# Patient Record
Sex: FEMALE | Race: WHITE | NOT HISPANIC OR LATINO | Employment: STUDENT | URBAN - METROPOLITAN AREA
[De-identification: names, ages, dates, MRNs, and addresses within clinical notes are randomized per-mention and may not be internally consistent; named-entity substitution may affect disease eponyms.]

---
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
## 2022-01-04 ENCOUNTER — TELEPHONE (OUTPATIENT)
Dept: PSYCHIATRY | Facility: CLINIC | Age: 22
End: 2022-01-04

## 2022-01-04 NOTE — TELEPHONE ENCOUNTER
Placed call to pt, regarding medication she increased due to death of her father  Explained to her that increasing the medication without the dr knowing is not advised  Covering dr suggested for her to get counseling  I offered a few appts for tomorrow  Per pt if I can't see Dr Satish Orourke, I don't want anyone else  I will find my own couselor  She also stated that the Xanax she increased wasn't doing anything for her at the lower dose so she isn't going to take it  I explained to her that Dr Vivian Durham is on a medical leave and she could make an appt for when she gets back to discuss her taking Xanax  She only wants to discuss the medication with Dr Vivian Durham and no one else

## 2022-01-21 ENCOUNTER — HOSPITAL ENCOUNTER (OUTPATIENT)
Dept: MRI IMAGING | Facility: HOSPITAL | Age: 22
Discharge: HOME | End: 2022-01-21
Payer: COMMERCIAL

## 2022-01-21 DIAGNOSIS — M54.81 OCCIPITAL NEURALGIA: ICD-10-CM

## 2022-01-21 PROCEDURE — G1004 CDSM NDSC: HCPCS

## 2022-01-21 PROCEDURE — 70551 MRI BRAIN STEM W/O DYE: CPT

## 2022-01-28 ENCOUNTER — TELEPHONE (OUTPATIENT)
Dept: NEUROLOGY | Facility: CLINIC | Age: 22
End: 2022-01-28

## 2022-02-09 ENCOUNTER — TELEPHONE (OUTPATIENT)
Dept: PSYCHIATRY | Facility: CLINIC | Age: 22
End: 2022-02-09

## 2022-02-09 NOTE — TELEPHONE ENCOUNTER
----- Message from Nicole Ibrahim sent at 2022  4:17 PM EST -----  Regarding: RE: Crisis  I a, sorry for her loss, but she has now is bereavement  Her 2 tabs of Xanax will simply reduce anxiety  Nut not her sadness or emotional pain   ----- Message -----  From: Kristina Norman  Sent: 2022   3:45 PM EST  To: Nicole Ibrahim, Nadira Silvestre  Subject: Crisis                                           Patients father just  yesterday and her anxiety is through the roof  She doesn't know what to do and was just crying on the phone  She tried increasing the xanax to 2 tabs and doesn't seem to help with the anxiety   Wants to know if she can take more or if it can be changed but she is hysterical

## 2022-03-03 ENCOUNTER — HOSPITAL ENCOUNTER (OUTPATIENT)
Dept: RADIOLOGY | Facility: HOSPITAL | Age: 22
Discharge: HOME | End: 2022-03-03
Payer: COMMERCIAL

## 2022-03-03 DIAGNOSIS — M94.0 COSTOCHONDRAL JUNCTION SYNDROME: ICD-10-CM

## 2022-03-03 PROCEDURE — 71046 X-RAY EXAM CHEST 2 VIEWS: CPT

## 2022-03-29 ENCOUNTER — APPOINTMENT (EMERGENCY)
Dept: RADIOLOGY | Facility: HOSPITAL | Age: 22
End: 2022-03-29
Payer: COMMERCIAL

## 2022-03-29 ENCOUNTER — HOSPITAL ENCOUNTER (EMERGENCY)
Facility: HOSPITAL | Age: 22
Discharge: HOME | End: 2022-03-29
Attending: EMERGENCY MEDICINE
Payer: COMMERCIAL

## 2022-03-29 VITALS
HEART RATE: 72 BPM | BODY MASS INDEX: 25.71 KG/M2 | TEMPERATURE: 98.1 F | OXYGEN SATURATION: 100 % | HEIGHT: 66 IN | WEIGHT: 160 LBS | SYSTOLIC BLOOD PRESSURE: 135 MMHG | RESPIRATION RATE: 18 BRPM | DIASTOLIC BLOOD PRESSURE: 75 MMHG

## 2022-03-29 DIAGNOSIS — K21.00 GASTROESOPHAGEAL REFLUX DISEASE WITH ESOPHAGITIS WITHOUT HEMORRHAGE: Primary | ICD-10-CM

## 2022-03-29 LAB
ALBUMIN SERPL BCP-MCNC: 4.1 G/DL (ref 3.5–5)
ALP SERPL-CCNC: 63 U/L (ref 46–384)
ALT SERPL W P-5'-P-CCNC: 24 U/L (ref 12–78)
ANION GAP SERPL CALCULATED.3IONS-SCNC: 8 MMOL/L (ref 4–13)
AST SERPL W P-5'-P-CCNC: 17 U/L (ref 5–45)
BASOPHILS # BLD AUTO: 0.02 THOUSANDS/ΜL (ref 0–0.1)
BASOPHILS NFR BLD AUTO: 1 % (ref 0–1)
BILIRUB SERPL-MCNC: 0.39 MG/DL (ref 0.2–1)
BUN SERPL-MCNC: 8 MG/DL (ref 5–25)
CALCIUM SERPL-MCNC: 9.2 MG/DL (ref 8.3–10.1)
CHLORIDE SERPL-SCNC: 104 MMOL/L (ref 100–108)
CO2 SERPL-SCNC: 27 MMOL/L (ref 21–32)
CREAT SERPL-MCNC: 0.81 MG/DL (ref 0.6–1.3)
D DIMER PPP FEU-MCNC: 0.38 UG/ML FEU
EOSINOPHIL # BLD AUTO: 0.03 THOUSAND/ΜL (ref 0–0.61)
EOSINOPHIL NFR BLD AUTO: 1 % (ref 0–6)
ERYTHROCYTE [DISTWIDTH] IN BLOOD BY AUTOMATED COUNT: 11.8 % (ref 11.6–15.1)
EXT PREG TEST URINE: NEGATIVE
EXT. CONTROL ED NAV: NORMAL
GFR SERPL CREATININE-BSD FRML MDRD: 105 ML/MIN/1.73SQ M
GLUCOSE SERPL-MCNC: 79 MG/DL (ref 65–140)
HCT VFR BLD AUTO: 43.3 % (ref 34.8–46.1)
HGB BLD-MCNC: 14.7 G/DL (ref 11.5–15.4)
IMM GRANULOCYTES # BLD AUTO: 0 THOUSAND/UL (ref 0–0.2)
IMM GRANULOCYTES NFR BLD AUTO: 0 % (ref 0–2)
LYMPHOCYTES # BLD AUTO: 1.31 THOUSANDS/ΜL (ref 0.6–4.47)
LYMPHOCYTES NFR BLD AUTO: 37 % (ref 14–44)
MAGNESIUM SERPL-MCNC: 2.2 MG/DL (ref 1.6–2.6)
MCH RBC QN AUTO: 30.4 PG (ref 26.8–34.3)
MCHC RBC AUTO-ENTMCNC: 33.9 G/DL (ref 31.4–37.4)
MCV RBC AUTO: 90 FL (ref 82–98)
MONOCYTES # BLD AUTO: 0.29 THOUSAND/ΜL (ref 0.17–1.22)
MONOCYTES NFR BLD AUTO: 8 % (ref 4–12)
NEUTROPHILS # BLD AUTO: 1.86 THOUSANDS/ΜL (ref 1.85–7.62)
NEUTS SEG NFR BLD AUTO: 53 % (ref 43–75)
NRBC BLD AUTO-RTO: 0 /100 WBCS
PHOSPHATE SERPL-MCNC: 3.7 MG/DL (ref 2.7–4.5)
PLATELET # BLD AUTO: 224 THOUSANDS/UL (ref 149–390)
PMV BLD AUTO: 10 FL (ref 8.9–12.7)
POTASSIUM SERPL-SCNC: 4 MMOL/L (ref 3.5–5.3)
PROT SERPL-MCNC: 7.7 G/DL (ref 6.4–8.2)
RBC # BLD AUTO: 4.84 MILLION/UL (ref 3.81–5.12)
SODIUM SERPL-SCNC: 139 MMOL/L (ref 136–145)
TSH SERPL DL<=0.05 MIU/L-ACNC: 2.88 UIU/ML (ref 0.46–3.98)
WBC # BLD AUTO: 3.51 THOUSAND/UL (ref 4.31–10.16)

## 2022-03-29 PROCEDURE — 93005 ELECTROCARDIOGRAM TRACING: CPT

## 2022-03-29 PROCEDURE — 84100 ASSAY OF PHOSPHORUS: CPT | Performed by: PHYSICIAN ASSISTANT

## 2022-03-29 PROCEDURE — 83735 ASSAY OF MAGNESIUM: CPT | Performed by: PHYSICIAN ASSISTANT

## 2022-03-29 PROCEDURE — 99285 EMERGENCY DEPT VISIT HI MDM: CPT

## 2022-03-29 PROCEDURE — 99285 EMERGENCY DEPT VISIT HI MDM: CPT | Performed by: PHYSICIAN ASSISTANT

## 2022-03-29 PROCEDURE — 85025 COMPLETE CBC W/AUTO DIFF WBC: CPT | Performed by: PHYSICIAN ASSISTANT

## 2022-03-29 PROCEDURE — 96365 THER/PROPH/DIAG IV INF INIT: CPT

## 2022-03-29 PROCEDURE — 80053 COMPREHEN METABOLIC PANEL: CPT | Performed by: PHYSICIAN ASSISTANT

## 2022-03-29 PROCEDURE — C9113 INJ PANTOPRAZOLE SODIUM, VIA: HCPCS | Performed by: PHYSICIAN ASSISTANT

## 2022-03-29 PROCEDURE — 71046 X-RAY EXAM CHEST 2 VIEWS: CPT

## 2022-03-29 PROCEDURE — 85379 FIBRIN DEGRADATION QUANT: CPT | Performed by: PHYSICIAN ASSISTANT

## 2022-03-29 PROCEDURE — 36415 COLL VENOUS BLD VENIPUNCTURE: CPT | Performed by: PHYSICIAN ASSISTANT

## 2022-03-29 PROCEDURE — 84443 ASSAY THYROID STIM HORMONE: CPT | Performed by: PHYSICIAN ASSISTANT

## 2022-03-29 PROCEDURE — 81025 URINE PREGNANCY TEST: CPT | Performed by: PHYSICIAN ASSISTANT

## 2022-03-29 RX ORDER — PANTOPRAZOLE SODIUM 20 MG/1
40 TABLET, DELAYED RELEASE ORAL 2 TIMES DAILY
Qty: 20 TABLET | Refills: 0 | Status: SHIPPED | OUTPATIENT
Start: 2022-03-29 | End: 2022-03-29 | Stop reason: SDUPTHER

## 2022-03-29 RX ORDER — PANTOPRAZOLE SODIUM 40 MG/1
40 TABLET, DELAYED RELEASE ORAL 2 TIMES DAILY
Qty: 28 TABLET | Refills: 0 | Status: SHIPPED | OUTPATIENT
Start: 2022-03-29 | End: 2022-04-12

## 2022-03-29 RX ADMIN — SODIUM CHLORIDE 80 MG: 9 INJECTION, SOLUTION INTRAVENOUS at 11:19

## 2022-03-29 NOTE — ED PROCEDURE NOTE
PROCEDURE  ECG 12 Lead Documentation Only    Date/Time: 3/29/2022 10:08 AM  Performed by: Danita Sosa DO  Authorized by: Danita Sosa DO     ECG reviewed by me, the ED Provider: yes    Patient location:  ED  Interpretation:     Interpretation: normal    Rate:     ECG rate:  66    ECG rate assessment: normal    Rhythm:     Rhythm: sinus rhythm    Ectopy:     Ectopy: none    ST segments:     ST segments:  Normal  T waves:     T waves: normal           Danita Sosa DO  03/29/22 1008

## 2022-03-29 NOTE — ED PROVIDER NOTES
History  Chief Complaint   Patient presents with    Chest Pain     has had cp when pressing on it on/off and this morning she felt like someone was stabbing her and states she is having rib pain and thought she heard a pop     Patient is a 68-year-old female with past medical history significant for anxiety, diverticulitis, who presents with her mother for evaluation of substernal chest pain that has been recurring for the past several months  She was previously evaluated for costochondritis and treated for such without resolution of her symptoms  On my exam she is tearful and frustrated and in obvious pain/discomfort  History provided by:  Patient  Chest Pain  Pain location:  Substernal area, R chest and L chest  Pain quality: pressure and stabbing    Pain radiates to:  Upper back  Pain radiates to the back: yes    Pain severity:  Severe  Onset quality:  Sudden  Duration:  1 month  Timing:  Intermittent  Progression:  Worsening  Chronicity:  New  Context: movement, at rest and stress    Context: not breathing, not eating and not lifting    Context comment:  - cough  Relieved by:  Nothing  Worsened by: Movement, coughing, exertion, deep breathing and certain positions  Ineffective treatments: Tylenol, Naprosyn  Associated symptoms: anorexia, anxiety, back pain and nausea    Associated symptoms: no abdominal pain, no cough, no diaphoresis, no dizziness, no fever, no headache, no heartburn, no PND, no shortness of breath, not vomiting and no weakness    Associated symptoms comment:  Worsens with food intake  Risk factors: no aortic disease, no birth control, no coronary artery disease, no diabetes mellitus, no Mina-Danlos syndrome, no high cholesterol, no hypertension, no immobilization, not male, not obese, not pregnant, no prior DVT/PE, no smoking and no surgery        Prior to Admission Medications   Prescriptions Last Dose Informant Patient Reported? Taking?    ALPRAZolam (XANAX) 0 25 mg tablet   No No Sig: Take 1 tablet (0 25 mg total) by mouth 2 (two) times a day as needed for anxiety   baclofen 20 mg tablet   No No   Sig: Take 1 tablet (20 mg total) by mouth 3 (three) times a day   escitalopram (LEXAPRO) 10 mg tablet   Yes No   Sig: Take 10 mg by mouth daily   naproxen (NAPROSYN) 250 mg tablet   No No   Sig: Take 1 tablet (250 mg total) by mouth 2 (two) times a day with meals      Facility-Administered Medications: None       Past Medical History:   Diagnosis Date    Anxiety     Deliberate self-cutting     Diverticulitis        Past Surgical History:   Procedure Laterality Date    MOUTH SURGERY      AL OPEN TX METACARPAL FRACTURE SINGLE EA BONE Right 3/6/2019    Procedure: OPEN REDUCTION W/ INTERNAL FIXATION (ORIF) HAND;  Surgeon: Pricilla Lewis DO;  Location: [facility];  Service: Orthopedics       Family History   Problem Relation Age of Onset    Hypertension Mother     No Known Problems Father     No Known Problems Sister     No Known Problems Brother     No Known Problems Maternal Aunt     No Known Problems Maternal Uncle     No Known Problems Paternal Aunt     No Known Problems Paternal Uncle     No Known Problems Maternal Grandmother     No Known Problems Maternal Grandfather     No Known Problems Paternal Grandmother     No Known Problems Paternal Grandfather     ADD / ADHD Neg Hx     Anesthesia problems Neg Hx     Cancer Neg Hx     Clotting disorder Neg Hx     Collagen disease Neg Hx     Diabetes Neg Hx     Dislocations Neg Hx     Learning disabilities Neg Hx     Neurological problems Neg Hx     Osteoporosis Neg Hx     Rheumatologic disease Neg Hx     Scoliosis Neg Hx     Vascular Disease Neg Hx      I have reviewed and agree with the history as documented      E-Cigarette/Vaping    E-Cigarette Use Current Every Day User      E-Cigarette/Vaping Substances    Nicotine Yes     THC Yes     CBD No     Flavoring Yes     Other No     Unknown No      Social History Tobacco Use    Smoking status: Never Smoker    Smokeless tobacco: Never Used   Vaping Use    Vaping Use: Every day    Substances: Nicotine, THC, Flavoring   Substance Use Topics    Alcohol use: Yes     Comment: occassional    Drug use: Yes     Types: Marijuana     Comment: seldom       Review of Systems   Constitutional: Positive for appetite change  Negative for chills, diaphoresis and fever  HENT: Negative  Eyes: Negative  Respiratory: Positive for chest tightness  Negative for apnea, cough, choking, shortness of breath, wheezing and stridor  Cardiovascular: Positive for chest pain  Negative for leg swelling and PND  Gastrointestinal: Positive for anorexia and nausea  Negative for abdominal distention, abdominal pain, blood in stool, constipation, heartburn and vomiting  Endocrine: Negative  Genitourinary: Negative  Musculoskeletal: Positive for back pain  Negative for myalgias  Skin: Negative  Negative for color change and rash  Allergic/Immunologic: Negative  Neurological: Negative for dizziness, syncope, weakness and headaches  Hematological: Negative  Psychiatric/Behavioral: Negative  Physical Exam  Physical Exam  Vitals and nursing note reviewed  Constitutional:       General: She is not in acute distress  Appearance: She is well-developed  She is not ill-appearing  HENT:      Head: Normocephalic and atraumatic  Eyes:      Conjunctiva/sclera: Conjunctivae normal    Neck:      Thyroid: No thyromegaly  Vascular: No JVD  Trachea: No tracheal deviation  Cardiovascular:      Rate and Rhythm: Normal rate and regular rhythm  No extrasystoles are present  Heart sounds: Normal heart sounds  No murmur heard  Pulmonary:      Effort: Pulmonary effort is normal  No respiratory distress  Breath sounds: Normal breath sounds  No decreased breath sounds, wheezing or rhonchi  Chest:      Chest wall: Tenderness present   No deformity or crepitus  Abdominal:      General: Abdomen is flat  Bowel sounds are normal  There is no distension  Palpations: Abdomen is soft  Tenderness: There is abdominal tenderness in the epigastric area  There is no right CVA tenderness or left CVA tenderness  Negative signs include South's sign and McBurney's sign  Hernia: No hernia is present  Musculoskeletal:      Cervical back: Neck supple  Right lower leg: No edema  Left lower leg: No edema  Lymphadenopathy:      Cervical: No cervical adenopathy  Skin:     General: Skin is warm and dry  Capillary Refill: Capillary refill takes less than 2 seconds  Neurological:      General: No focal deficit present  Mental Status: She is alert and oriented to person, place, and time  Vital Signs  ED Triage Vitals   Temperature Pulse Respirations Blood Pressure SpO2   03/29/22 0929 03/29/22 0929 03/29/22 0929 03/29/22 0929 03/29/22 0929   97 9 °F (36 6 °C) 88 18 137/78 100 %      Temp Source Heart Rate Source Patient Position - Orthostatic VS BP Location FiO2 (%)   03/29/22 1236 03/29/22 1236 03/29/22 1236 03/29/22 1236 --   Oral Monitor Lying Right arm       Pain Score       03/29/22 0929       6           Vitals:    03/29/22 0929 03/29/22 0930 03/29/22 1236   BP: 137/78 137/78 135/75   Pulse: 88 96 72   Patient Position - Orthostatic VS:   Lying         Visual Acuity      ED Medications  Medications   pantoprazole (PROTONIX) 80 mg in sodium chloride 0 9 % 100 mL IVPB (0 mg Intravenous Stopped 3/29/22 1202)       Diagnostic Studies  Results Reviewed     Procedure Component Value Units Date/Time    TSH [[id number]]  (Normal) Collected: 03/29/22 1106    Lab Status: Final result Specimen: Blood from Arm, Right Updated: 03/29/22 1142     TSH 3RD GENERATON 2 881 uIU/mL     Narrative:      Patients undergoing fluorescein dye angiography may retain small amounts of fluorescein in the body for 48-72 hours post procedure   Samples containing fluorescein can produce falsely depressed TSH values  If the patient had this procedure,a specimen should be resubmitted post fluorescein clearance        Phosphorus [[id number]]  (Normal) Collected: 03/29/22 1106    Lab Status: Final result Specimen: Blood from Arm, Right Updated: 03/29/22 1142     Phosphorus 3 7 mg/dL     Magnesium [[id number]]  (Normal) Collected: 03/29/22 1106    Lab Status: Final result Specimen: Blood from Arm, Right Updated: 03/29/22 1142     Magnesium 2 2 mg/dL     Comprehensive metabolic panel [[id number]] Collected: 03/29/22 1106    Lab Status: Final result Specimen: Blood from Arm, Right Updated: 03/29/22 1134     Sodium 139 mmol/L      Potassium 4 0 mmol/L      Chloride 104 mmol/L      CO2 27 mmol/L      ANION GAP 8 mmol/L      BUN 8 mg/dL      Creatinine 0 81 mg/dL      Glucose 79 mg/dL      Calcium 9 2 mg/dL      AST 17 U/L      ALT 24 U/L      Alkaline Phosphatase 63 U/L      Total Protein 7 7 g/dL      Albumin 4 1 g/dL      Total Bilirubin 0 39 mg/dL      eGFR 105 ml/min/1 73sq m     Narrative:      [place] guidelines for Chronic Kidney Disease (CKD):     Stage 1 with normal or high GFR (GFR > 90 mL/min/1 73 square meters)    Stage 2 Mild CKD (GFR = 60-89 mL/min/1 73 square meters)    Stage 3A Moderate CKD (GFR = 45-59 mL/min/1 73 square meters)    Stage 3B Moderate CKD (GFR = 30-44 mL/min/1 73 square meters)    Stage 4 Severe CKD (GFR = 15-29 mL/min/1 73 square meters)    Stage 5 End Stage CKD (GFR <15 mL/min/1 73 square meters)  Note: GFR calculation is accurate only with a steady state creatinine    D-Dimer [[id number]]  (Normal) Collected: 03/29/22 1106    Lab Status: Final result Specimen: Blood from Arm, Right Updated: 03/29/22 1131     D-Dimer, Quant 0 38 ug/ml FEU     CBC and differential [[id number]]  (Abnormal) Collected: 03/29/22 1106    Lab Status: Final result Specimen: Blood from Arm, Right Updated: 03/29/22 1112     WBC 3 51 Thousand/uL      RBC 4 84 Million/uL      Hemoglobin 14 7 g/dL      Hematocrit 43 3 %      MCV 90 fL      MCH 30 4 pg      MCHC 33 9 g/dL      RDW 11 8 %      MPV 10 0 fL      Platelets 440 Thousands/uL      nRBC 0 /100 WBCs      Neutrophils Relative 53 %      Immat GRANS % 0 %      Lymphocytes Relative 37 %      Monocytes Relative 8 %      Eosinophils Relative 1 %      Basophils Relative 1 %      Neutrophils Absolute 1 86 Thousands/µL      Immature Grans Absolute 0 00 Thousand/uL      Lymphocytes Absolute 1 31 Thousands/µL      Monocytes Absolute 0 29 Thousand/µL      Eosinophils Absolute 0 03 Thousand/µL      Basophils Absolute 0 02 Thousands/µL     POCT pregnancy, urine [[id number]]  (Normal) Resulted: 03/29/22 1020    Lab Status: Final result Updated: 03/29/22 1020     EXT PREG TEST UR (Ref: Negative) negative     Control valid                 XR chest 2 views   Final Result by Yakov Stout MD (03/29 1122)      No acute cardiopulmonary disease                    Workstation performed: [id number]                    Procedures  ECG 12 Lead Documentation Only    Date/Time: 3/29/2022 4:43 PM  Performed by: Abilio Ly PA-C  Authorized by: Abilio Ly PA-C                ED Course  ED Course as of 03/29/22 1647   Tue Mar 29, 2022   1137 CBC and differential(!)   1137 D-Dimer   1137 Potassium: 4 0   1138 Creatinine: 0 81                                             MDM  Number of Diagnoses or Management Options  Gastroesophageal reflux disease with esophagitis without hemorrhage: new and requires workup  Diagnosis management comments: Patient presents with atypical chest pain-complaints are consistent with GERD  Cardiopulmonary evaluation was negative for acute findings  Chest x-ray without evidence of infection or fracture  Patient given Protonix in the ED with resolution of symptoms  Patient be discharged home on 40 mg Protonix b i d  with follow-up with Gastroenterology for further evaluation    Depression-anxiety  On interview patient and mother expresses difficulty in getting follow-up for evaluation of patient's depression and anxiety  Patient has recently lost her father which has exacerbated many of her underlying issues  Discussed case with crisis and was able to obtain number for tele health visit for mother  Crisis team is available to follow up with family if they have any further questions or concerns       Amount and/or Complexity of Data Reviewed  Clinical lab tests: ordered and reviewed  Tests in the radiology section of CPT®: ordered and reviewed  Tests in the medicine section of CPT®: ordered and reviewed  Decide to obtain previous medical records or to obtain history from someone other than the patient: yes  Review and summarize past medical records: yes  Independent visualization of images, tracings, or specimens: yes    Risk of Complications, Morbidity, and/or Mortality  Presenting problems: moderate  Diagnostic procedures: moderate  Management options: moderate    Patient Progress  Patient progress: stable      Disposition  Final diagnoses:   Gastroesophageal reflux disease with esophagitis without hemorrhage     Time reflects when diagnosis was documented in both MDM as applicable and the Disposition within this note     Time User Action Codes Description Comment    3/29/2022 12:13 PM Greer  John [K21 00] Gastroesophageal reflux disease with esophagitis without hemorrhage       ED Disposition     ED Disposition Condition Date/Time Comment    Discharge Stable Tue Mar 29, 2022 12:13 PM Gurinder Erickson discharge to home/self care              Follow-up Information     Follow up With Specialties Details Why Contact Info    Evelina Akbar MD Family Medicine Call  As needed [facility]  [phone]      Stacy Logan MD Gastroenterology Schedule an appointment as soon as possible for a visit   [place] [id number]  [phone]            Discharge Medication List as of 3/29/2022 12:28 PM      START taking these medications    Details   pantoprazole (PROTONIX) 20 mg tablet Take 2 tablets (40 mg total) by mouth 2 (two) times a day, Starting Tue 3/29/2022, Normal         CONTINUE these medications which have NOT CHANGED    Details   ALPRAZolam (XANAX) 0 25 mg tablet Take 1 tablet (0 25 mg total) by mouth 2 (two) times a day as needed for anxiety, Starting Tue 12/21/2021, Normal      baclofen 20 mg tablet Take 1 tablet (20 mg total) by mouth 3 (three) times a day, Starting Fri 12/10/2021, Normal      escitalopram (LEXAPRO) 10 mg tablet Take 10 mg by mouth daily, Historical Med      naproxen (NAPROSYN) 250 mg tablet Take 1 tablet (250 mg total) by mouth 2 (two) times a day with meals, Starting Fri 12/10/2021, Normal             No discharge procedures on file      PDMP Review     None          ED Provider  Electronically Signed by           Rica Dey PA-C  03/29/22 9935

## 2022-03-29 NOTE — Clinical Note
Rob Larson was seen and treated in our emergency department on 3/29/2022  Diagnosis:     Danilo Evans    She may return on this date: 03/30/2022         If you have any questions or concerns, please don't hesitate to call        Franny Cabrera PA-C    ______________________________           _______________          _______________  Hospital Representative                              Date                                Time

## 2022-03-31 ENCOUNTER — TELEPHONE (OUTPATIENT)
Dept: NEUROLOGY | Facility: CLINIC | Age: 22
End: 2022-03-31

## 2022-04-01 LAB
ATRIAL RATE: 66 BPM
P AXIS: 57 DEGREES
PR INTERVAL: 154 MS
QRS AXIS: 65 DEGREES
QRSD INTERVAL: 92 MS
QT INTERVAL: 388 MS
QTC INTERVAL: 406 MS
T WAVE AXIS: 38 DEGREES
VENTRICULAR RATE: 66 BPM

## 2022-04-01 PROCEDURE — 93010 ELECTROCARDIOGRAM REPORT: CPT | Performed by: INTERNAL MEDICINE

## 2022-04-01 NOTE — TELEPHONE ENCOUNTER
2 nd call  Tried to contact the patient to confirm the appointment on 04/04/22, no answer, voicemail full

## 2022-04-15 ENCOUNTER — HOSPITAL ENCOUNTER (OUTPATIENT)
Dept: RADIOLOGY | Facility: HOSPITAL | Age: 22
Discharge: HOME | End: 2022-04-15
Payer: COMMERCIAL

## 2022-04-15 DIAGNOSIS — R10.13 EPIGASTRIC PAIN: ICD-10-CM

## 2022-04-15 DIAGNOSIS — R10.2 PELVIC PAIN: ICD-10-CM

## 2022-04-15 PROCEDURE — 76830 TRANSVAGINAL US NON-OB: CPT

## 2022-04-15 PROCEDURE — 76856 US EXAM PELVIC COMPLETE: CPT

## 2022-08-25 ENCOUNTER — TELEMEDICINE (OUTPATIENT)
Dept: PSYCHIATRY | Facility: CLINIC | Age: 22
End: 2022-08-25
Payer: COMMERCIAL

## 2022-08-25 DIAGNOSIS — F63.81 INTERMITTENT EXPLOSIVE DISORDER: ICD-10-CM

## 2022-08-25 DIAGNOSIS — F41.1 GAD (GENERALIZED ANXIETY DISORDER): Primary | ICD-10-CM

## 2022-08-25 DIAGNOSIS — F41.9 ANXIETY: ICD-10-CM

## 2022-08-25 DIAGNOSIS — F33.1 MAJOR DEPRESSIVE DISORDER, RECURRENT, MODERATE (HCC): ICD-10-CM

## 2022-08-25 PROCEDURE — 99214 OFFICE O/P EST MOD 30 MIN: CPT | Performed by: NURSE PRACTITIONER

## 2022-08-25 PROCEDURE — 90833 PSYTX W PT W E/M 30 MIN: CPT | Performed by: NURSE PRACTITIONER

## 2022-08-25 RX ORDER — ESCITALOPRAM OXALATE 10 MG/1
10 TABLET ORAL DAILY
Qty: 30 TABLET | Refills: 3 | Status: SHIPPED | OUTPATIENT
Start: 2022-08-25

## 2022-08-25 NOTE — PSYCH
Virtual Regular Visit    Problem List Items Addressed This Visit    None       Reason for visit is No chief complaint on file  Encounter provider Jah Kelly, PhD    Provider located at [address] [place] [address]  [address]  [place] [address]  [phone]    Recent Visits  No visits were found meeting these conditions  Showing recent visits within past 7 days and meeting all other requirements  Future Appointments  No visits were found meeting these conditions  Showing future appointments within next 150 days and meeting all other requirements       After connecting through [facility], the patient was identified by name and date of birth  Marleny Caro was informed that this is a telemedicine visit and that the visit is being conducted through [address] [place] [address] [facility] and patient was informed that this is a secure, HIPAA-compliant platform  She agrees to proceed  which may not be secure and therefore, might not be HIPAA-compliant  My office door was closed  No one else was in the room  She acknowledged consent and understanding of privacy and security of the video platform  The patient has agreed to participate and understands they can discontinue the visit at any time  SUBJECTIVE:    Marleny Caro is a 23 y o  female with a history of IED, anxiety and depression seen for medication management and mood assessment  Maryann Kennedy reports she stopped her medication and became very depressed from January to July  Her father  of CoVid in January and she has coped fairly well  She resumed her Lexapro and reports feeling "wonderful " Appetite and sleep are described as good  Takes medication now as prescribed  Not taking Xanax and does not feel a need for it  She reports being social and 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  Family are supportive   Reports she may have OCD due to frequently checking stove if it is off, compulsively cleaning her room, and over thinking everything       HPI ROS Appetite Changes and Sleep: normal appetite and normal energy level    Review Of Systems:     Mood Anxiety and Depression   Behavior Normal    Thought Content Normal   General Normal    Personality Normal   Other Psych Symptoms Normal   Constitutional Normal   ENT As Noted in HPI   Cardiovascular As Noted in HPI   Respiratory As Noted in HPI   Gastrointestinal As Noted in HPI   Genitourinary As Noted in HPI   Musculoskeletal As Noted in HPI   Integumentary As Noted in HPI   Neurological As Noted in HPI   Endocrine Normal    Other Symptoms Normal        Substance Abuse History:    Social History     Substance and Sexual Activity   Drug Use Yes    Types: Marijuana    Comment: seldom       Family Psychiatric History:     Family History   Problem Relation Age of Onset    Hypertension Mother     No Known Problems Father     No Known Problems Sister     No Known Problems Brother     No Known Problems Maternal Aunt     No Known Problems Maternal Uncle     No Known Problems Paternal Aunt     No Known Problems Paternal Uncle     No Known Problems Maternal Grandmother     No Known Problems Maternal Grandfather     No Known Problems Paternal Grandmother     No Known Problems Paternal Grandfather     ADD / ADHD Neg Hx     Anesthesia problems Neg Hx     Cancer Neg Hx     Clotting disorder Neg Hx     Collagen disease Neg Hx     Diabetes Neg Hx     Dislocations Neg Hx     Learning disabilities Neg Hx     Neurological problems Neg Hx     Osteoporosis Neg Hx     Rheumatologic disease Neg Hx     Scoliosis Neg Hx     Vascular Disease Neg Hx        Social History     Socioeconomic History    Marital status: Single     Spouse name: Not on file    Number of children: Not on file    Years of education: Not on file    Highest education level: Not on file   Occupational History    Not on file   Tobacco Use    Smoking status: Never Smoker    Smokeless tobacco: Never Used   Vaping Use    Vaping Use: Every day    Substances: Nicotine, THC, Flavoring   Substance and Sexual Activity    Alcohol use: Yes     Comment: occassional    Drug use: Yes     Types: Marijuana     Comment: seldom    Sexual activity: Not on file   Other Topics Concern    Not on file   Social History Narrative    Not on file     Social Determinants of Health     Financial Resource Strain: Not on file   Food Insecurity: Not on file   Transportation Needs: Not on file   Physical Activity: Not on file   Stress: Not on file   Social Connections: Not on file   Intimate Partner Violence: Not on file   Housing Stability: Not on file       Past Medical History:   Diagnosis Date    Anxiety     Deliberate self-cutting     Diverticulitis        Past Surgical History:   Procedure Laterality Date    MOUTH SURGERY      GA OPEN TX METACARPAL FRACTURE SINGLE EA BONE Right 3/6/2019    Procedure: OPEN REDUCTION W/ INTERNAL FIXATION (ORIF) HAND;  Surgeon: Catia Sullivan DO;  Location: [address] [place] [address];  Service: Orthopedics       Current Outpatient Medications   Medication Sig Dispense Refill    escitalopram (LEXAPRO) 10 mg tablet Take 10 mg by mouth daily      ALPRAZolam (XANAX) 0 25 mg tablet Take 1 tablet (0 25 mg total) by mouth 2 (two) times a day as needed for anxiety (Patient not taking: Reported on 8/25/2022) 60 tablet 0    baclofen 20 mg tablet Take 1 tablet (20 mg total) by mouth 3 (three) times a day 20 tablet 0    naproxen (NAPROSYN) 250 mg tablet Take 1 tablet (250 mg total) by mouth 2 (two) times a day with meals 20 tablet 0    pantoprazole (PROTONIX) 40 mg tablet Take 1 tablet (40 mg total) by mouth 2 (two) times a day for 14 days 28 tablet 0     No current facility-administered medications for this visit          Allergies   Allergen Reactions    Amoxicillin Diarrhea       The following portions of the patient's history were reviewed and updated as appropriate: allergies, current medications, past family history, past medical history, past social history, past surgical history and problem list     OBJECTIVE:     Mental Status Examination:    Appearance calm and cooperative , adequate hygiene and grooming and good eye contact    Mood anxious   Affect affect appropriate    Speech a normal rate   Thought Processes normal thought processes   Hallucinations no hallucinations present    Thought Content no delusions   Abnormal Thoughts no suicidal thoughts  and no homicidal thoughts    Orientation  oriented to person and place and time   Remote Memory short term memory intact and long term memory intact   Attention Span concentration intact   Intellect Appears to be of Average Intelligence   Insight Insight intact   Judgement judgment was intact   Muscle Strength Muscle strength and tone were normal   Language no difficulty naming common objects   Fund of Knowledge displays adequate knowledge of current events   Pain none   Pain Scale 0       Laboratory Results: No results found for this or any previous visit  Assessment/Plan:       There are no diagnoses linked to this encounter  Treatment Recommendations- Risks Benefits      Immediate Medical/Psychiatric/Psychotherapy Treatments and Any Precautions:  reviewed medication continue with treatment plan, discussed possible OCD and treatment options  She wants to wait for any change in medication or addition  Coping with loss of father was explored and support was provided   Instructed to call undersigned if she is thinking about stopping her medication    Risks, Benefits And Possible Side Effects Of Medications:  {PSYCH RISK, BENEFITS AND POSSIBLE SIDE EFFECTS (Optional):86386       Psychotherapy Provided: 25 min  Supportive therapy  Medication evaluation  Medication education  Coping skills regarding grief  Educated on OCD    Goals discussed in session: maintain stable mood       Treatment Plan:    Completed and signed during the session: Yes - Treatment Plan done but not signed at time of office visit due to:  Plan reviewed by video and verbal consent given due to [address] distancing enacted December 27, 2021, updated August 25, 2022              Julio Cesar Hill, PhD 08/25/22

## 2022-08-25 NOTE — BH TREATMENT PLAN
TREATMENT PLAN (Medication Management Only)         [facility]     Name and Date of Birth:  Luz Marina Sosa 23 y o  2000  Date of Treatment Plan: December 27, 2021, August 25, 2022  Diagnosis/Diagnoses:    1  Major depressive disorder, recurrent, moderate (HCC)    2  Intermittent explosive disorder    3  Anxiety       Strengths/Personal Resources for Self-Care: supportive family, taking medications as prescribed, ability to communicate needs  Area/Areas of need (in own words): mood instability  1  Long Term Goal: improve mood stability  Target Date:6 months - 2/25/2023  Person/Persons responsible for completion of goal: Jeffrey Brooks, provider, family  2  Short Term Objective (s) - How will we reach this goal?:   A  Provider new recommended medication/dosage changes and/or continue medication(s): continue current medications as prescribed  B  coping skills  C  adequate sleep and nutrition  Target Date:6 months - 2/25/2023  Person/Persons Responsible for Completion of Goal: Jeffrey Brooks, provider, family  Progress Towards Goals: continuing treatment  Treatment Modality: medication management every 3 months  Review due 180 days from date of this plan: 6 months - 2/25/2023  Expected length of service: ongoing treatment  My Physician/PA/NP and I have developed this plan together and I agree to work on the goals and objectives  I understand the treatment goals that were developed for my treatment

## 2023-03-20 ENCOUNTER — OFFICE VISIT (OUTPATIENT)
Dept: PSYCHIATRY | Facility: CLINIC | Age: 23
End: 2023-03-20

## 2023-03-20 VITALS
BODY MASS INDEX: 26.36 KG/M2 | DIASTOLIC BLOOD PRESSURE: 85 MMHG | WEIGHT: 164 LBS | HEIGHT: 66 IN | SYSTOLIC BLOOD PRESSURE: 144 MMHG

## 2023-03-20 DIAGNOSIS — F63.81 INTERMITTENT EXPLOSIVE DISORDER: ICD-10-CM

## 2023-03-20 DIAGNOSIS — F41.1 GAD (GENERALIZED ANXIETY DISORDER): ICD-10-CM

## 2023-03-20 DIAGNOSIS — F33.1 MAJOR DEPRESSIVE DISORDER, RECURRENT, MODERATE (HCC): ICD-10-CM

## 2023-03-20 DIAGNOSIS — F41.9 ANXIETY: Primary | ICD-10-CM

## 2023-03-20 RX ORDER — ESCITALOPRAM OXALATE 10 MG/1
15 TABLET ORAL DAILY
Qty: 30 TABLET | Refills: 3 | Status: SHIPPED | OUTPATIENT
Start: 2023-03-20

## 2023-03-20 RX ORDER — ALPRAZOLAM 0.5 MG/1
0.5 TABLET ORAL 2 TIMES DAILY PRN
Qty: 60 TABLET | Refills: 0 | Status: SHIPPED | OUTPATIENT
Start: 2023-03-20

## 2023-03-20 NOTE — BH TREATMENT PLAN
TREATMENT PLAN (Medication Management Only)         [facility]     Name and Date of Solis Cabrera  2000  Date of Treatment Plan: December 27, 2021, August 25, 2022, March 20, 2023  Diagnosis/Diagnoses:    1  Major depressive disorder, recurrent, moderate (HCC)    2  Intermittent explosive disorder    3  Anxiety       Strengths/Personal Resources for Self-Care: supportive family, taking medications as prescribed, ability to communicate needs  Area/Areas of need (in own words): mood instability  1          Long Term Goal: improve mood stability  Target Date:6 months - 9/20/2023  Person/Persons responsible for completion of goal: Nancie Harrison, provider, family  2          Short Term Objective (s) - How will we reach this goal?:   A  Provider new recommended medication/dosage changes and/or continue medication(s): continue current medications as prescribed  B  coping skills  C  adequate sleep and nutrition  Target Date:6 months - 9/20/2023  Person/Persons Responsible for Completion of Goal: Nancie Harrison, provider, family  Progress Towards Goals: continuing treatment  Treatment Modality: medication management every 3 months  Review due 180 days from date of this plan: 6 months - 9/20/2023  Expected length of service: ongoing treatment  My Physician/PA/NP and I have developed this plan together and I agree to work on the goals and objectives   I understand the treatment goals that were developed for my treatment

## 2023-03-21 NOTE — PATIENT INSTRUCTIONS
Increase Lexapro to 15 mg daily  And increase Xanax to 0 5 mg twice daily as needed for anxiety  Call with problems or concerns  Obtain therapist

## 2023-03-21 NOTE — PSYCH
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  She works part-time at [employer], and so far it is not stressful  She reports stress from sister being in rehab  Relationship with mother is reported to be mildly improved  Coping with father's death from [place] in 2022  Denies suicidal ideations or feelings of self-harm  She reports appetite is good and and she is sleeping  Takes medication as prescribed; however, Xanax is not working 0 25 mg    Family and friends are supportive    HPI ROS Appetite Changes and Sleep: normal appetite and normal energy level    Review Of Systems:     Mood Anxiety, Depression and Emotional Lability   Behavior Normal    Thought Content Normal   General Emotional Problems   Personality Normal   Other Psych Symptoms Normal   Constitutional As Noted in HPI   ENT As Noted in HPI   Cardiovascular As Noted in HPI   Respiratory As Noted in HPI   Gastrointestinal As Noted in HPI   Genitourinary As Noted in HPI   Musculoskeletal As Noted in HPI   Integumentary As Noted in HPI   Neurological As Noted in HPI   Endocrine Normal    Other Symptoms Normal        Substance Abuse History:  Social History     Substance and Sexual Activity   Drug Use Yes   • Types: Marijuana    Comment: seldom       Family Psychiatric History:   Family History   Problem Relation Age of Onset   • Hypertension Mother    • No Known Problems Father    • No Known Problems Sister    • No Known Problems Brother    • No Known Problems Maternal Aunt    • No Known Problems Maternal Uncle    • No Known Problems Paternal Aunt    • No Known Problems Paternal Uncle    • No Known Problems Maternal Grandmother    • No Known Problems Maternal Grandfather    • No Known Problems Paternal Grandmother    • No Known Problems Paternal Grandfather    • ADD / ADHD Neg Hx    • Anesthesia problems Neg Hx    • Cancer Neg Hx    • Clotting disorder Neg Hx    • Collagen disease Neg Hx    • Diabetes Neg Hx    • Dislocations Neg Hx    • Learning disabilities Neg Hx    • Neurological problems Neg Hx    • Osteoporosis Neg Hx    • Rheumatologic disease Neg Hx    • Scoliosis Neg Hx    • Vascular Disease Neg Hx        The following portions of the patient's history were reviewed and updated as appropriate: allergies, current medications, past family history, past medical history, past social history, past surgical history and problem list     Social History     Socioeconomic History   • Marital status: Single     Spouse name: Not on file   • Number of children: Not on file   • Years of education: Not on file   • Highest education level: Not on file   Occupational History   • Not on file   Tobacco Use   • Smoking status: Never   • Smokeless tobacco: Never   Vaping Use   • Vaping Use: Every day   • Substances: Nicotine, THC, Flavoring   Substance and Sexual Activity   • Alcohol use: Yes     Comment: occassional   • Drug use: Yes     Types: Marijuana     Comment: seldom   • Sexual activity: Not on file   Other Topics Concern   • Not on file   Social History Narrative   • Not on file     Social Determinants of Health     Financial Resource Strain: Not on file   Food Insecurity: Not on file   Transportation Needs: Not on file   Physical Activity: Not on file   Stress: Not on file   Social Connections: Not on file   Intimate Partner Violence: Not on file   Housing Stability: Not on file     Social History     Social History Narrative   • Not on file       Objective:       Mental status:  Appearance adequate hygiene and grooming, restless and fidgety and good eye contact    Mood depressed, anxious and mood appropriate   Affect affect appropriate    Speech a normal rate   Thought Processes normal thought processes   Hallucinations no hallucinations present    Thought Content no delusions   Abnormal Thoughts no suicidal thoughts  and no homicidal thoughts    Orientation  oriented to person and place and time   Remote Memory short term memory intact and long term memory intact   Attention Span concentration intact   Intellect Appears to be of Average Intelligence   Insight Limited insight   Judgement judgment was intact   Muscle Strength Muscle strength and tone were normal   Language no difficulty naming common objects   Fund of Knowledge displays adequate knowledge of current events   Pain none   Pain Scale 0       Assessment/Plan:       Diagnoses and all orders for this visit:    Anxiety  -     ALPRAZolam (XANAX) 0 5 mg tablet; Take 1 tablet (0 5 mg total) by mouth 2 (two) times a day as needed for anxiety  -     Ambulatory referral to [facility]; Future    Intermittent explosive disorder  -     Ambulatory referral to [facility]; Future    Major depressive disorder, recurrent, moderate ([facility] )  -     Ambulatory referral to [facility]; Future    SERENITY (generalized anxiety disorder)  -     escitalopram (LEXAPRO) 10 mg tablet; Take 1 5 tablets (15 mg total) by mouth daily  -     ALPRAZolam (XANAX) 0 5 mg tablet; Take 1 tablet (0 5 mg total) by mouth 2 (two) times a day as needed for anxiety  -     Ambulatory referral to [facility]; Future            Treatment Recommendations- Risks Benefits      Immediate Medical/Psychiatric/Psychotherapy Treatments and Any Precautions:  reviewed medication continue with treatment plan, increase Lexapro to 15 mg p o  daily, increase Xanax to 0 5 mg twice daily as needed for anxiety  Resume therapy to learn coping skills  Plan next session to assess mood and determine if lab work is needed      Risks, Benefits And Possible Side Effects Of Medications:  {PSYCH RISK, BENEFITS AND POSSIBLE SIDE EFFECTS (Optional):30294    Controlled Medication Discussion: She understands safe use and storage of medication     Psychotherapy Provided: 30min Individual psychotherapy provided  Supportive therapy  Medication evaluation  Medication education  Mood assessment  Coping skills with anxiety  Encouraged resuming therapy    Referral initiated for outpatient therapy    Goals discussed in session: Reduced anxiety, improve coping skills    Treatment plan due this session

## 2023-06-09 DIAGNOSIS — F41.1 GAD (GENERALIZED ANXIETY DISORDER): ICD-10-CM

## 2023-06-09 DIAGNOSIS — F41.9 ANXIETY: ICD-10-CM

## 2023-06-09 NOTE — TELEPHONE ENCOUNTER
Medication Refill Request     Name of Medication ALPRAZolam (XANAX) 0 5 mg tablet  Dose/Frequency Take 1 tablet (0 5 mg total) by mouth 2 (two) times a day as needed for anxiety  Quantity 60  Verified pharmacy   [x]  Verified ordering Provider   [x]  Does patient have enough for the next 3 days? Yes [] No [x]  Does patient have a follow-up appointment scheduled?  Yes [x] No []   If so when is appointment: 07/27/23 @ 10 AM

## 2023-06-12 RX ORDER — ALPRAZOLAM 0.5 MG/1
0.5 TABLET ORAL 2 TIMES DAILY PRN
Qty: 60 TABLET | Refills: 0 | Status: SHIPPED | OUTPATIENT
Start: 2023-06-12

## 2023-06-19 ENCOUNTER — TELEPHONE (OUTPATIENT)
Dept: PSYCHIATRY | Facility: CLINIC | Age: 23
End: 2023-06-19

## 2023-06-19 NOTE — TELEPHONE ENCOUNTER
Attempted to call patient to schedule np apt for therapy - mailbox was full and unable to leave a message

## 2023-06-22 ENCOUNTER — SOCIAL WORK (OUTPATIENT)
Dept: BEHAVIORAL/MENTAL HEALTH CLINIC | Facility: CLINIC | Age: 23
End: 2023-06-22
Payer: COMMERCIAL

## 2023-06-22 DIAGNOSIS — F43.10 POST-TRAUMATIC STRESS DISORDER: Primary | ICD-10-CM

## 2023-06-22 PROCEDURE — 90791 PSYCH DIAGNOSTIC EVALUATION: CPT | Performed by: SOCIAL WORKER
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
## 2023-06-23 PROBLEM — F43.10 POST-TRAUMATIC STRESS DISORDER: Status: ACTIVE | Noted: 2023-06-23

## 2023-07-13 ENCOUNTER — TELEPHONE (OUTPATIENT)
Dept: PSYCHIATRY | Facility: CLINIC | Age: 23
End: 2023-07-13

## 2023-07-13 NOTE — TELEPHONE ENCOUNTER
Received call from patient and she stated she wishes to cancel all her appointments with Carmen STEELEW due to provider is now virtual for a length of time (due to medical reasons) and patient doesn't do well for virtual appointments. She needs face to face in person. Switch providers to [facility] Cornelius.  Open slots are Every other Friday at 8:00 am start 7/21/23, Every other Tuesday at 12:00 pm start 7/18/2023, or Every other Wednesday at 3:00 pm start 7/19/2023. Tried to call patient but "mailbox is full". I will keep trying. Patient has an intake in media ok'd to use that one per Noe Kidd

## 2023-07-20 DIAGNOSIS — F41.1 GAD (GENERALIZED ANXIETY DISORDER): ICD-10-CM

## 2023-07-21 RX ORDER — ESCITALOPRAM OXALATE 10 MG/1
15 TABLET ORAL DAILY
Qty: 30 TABLET | Refills: 3 | Status: SHIPPED | OUTPATIENT
Start: 2023-07-21

## 2023-09-16 DIAGNOSIS — F41.9 ANXIETY: ICD-10-CM

## 2023-09-16 DIAGNOSIS — F41.1 GAD (GENERALIZED ANXIETY DISORDER): ICD-10-CM

## 2023-09-16 RX ORDER — ALPRAZOLAM 0.5 MG/1
0.5 TABLET ORAL 2 TIMES DAILY PRN
Qty: 60 TABLET | Refills: 0 | Status: SHIPPED | OUTPATIENT
Start: 2023-09-16

## 2023-10-17 DIAGNOSIS — F41.9 ANXIETY: ICD-10-CM

## 2023-10-17 DIAGNOSIS — F41.1 GAD (GENERALIZED ANXIETY DISORDER): ICD-10-CM

## 2023-10-17 RX ORDER — ALPRAZOLAM 0.5 MG/1
0.5 TABLET ORAL 2 TIMES DAILY PRN
Qty: 60 TABLET | Refills: 0 | Status: SHIPPED | OUTPATIENT
Start: 2023-10-17

## 2023-10-23 ENCOUNTER — DOCUMENTATION (OUTPATIENT)
Dept: BEHAVIORAL/MENTAL HEALTH CLINIC | Facility: CLINIC | Age: 23
End: 2023-10-23

## 2023-10-23 NOTE — PROGRESS NOTES
Psychotherapy Discharge Summary    Preferred Name: Alexandro Flores  YOB: 2000    Admission date to psychotherapy: 6/22/2023    Referred by: Dr. Janette Romero    Presenting Problem: Slime Lugo, gets paranoid sometimes, feels everyone look at her, watching her, terrified things will go wrong, history of panic attacks, feels sterssed often, nightmares (2x weekly). History of depression, trauma, self harm and suicidal ideation in middle school. She does not feel depressed and thinks her depression has shifted to anxiety. Course of treatment included : medication management and individual therapy    Progress/Outcome of Treatment Goals (brief summary of course of treatment) Patient was seen for assessment only. Pt wanted to switch to new provider when provider could only offer virtual for period of time. Per chart, staff tried to reach out to transfer services to [employer] with no success.       Treatment Complications (if any): None    Treatment Progress: fair    Current [facility] Psychiatric Provider: Dr. Janette Romero    Discharge Medications include: See Chart    Discharge Date: 10/23/2023    Discharge Diagnosis: Post Traumatic Stress Disorder F43.10    Criteria for Discharge:  Not particippating or returning calls to make successful transfer    Aftercare recommendations include (include specific referral names and phone numbers, if appropriate): Return if/as needed    Prognosis: fair

## 2023-10-30 ENCOUNTER — TELEPHONE (OUTPATIENT)
Dept: PSYCHIATRY | Facility: CLINIC | Age: 23
End: 2023-10-30

## 2023-10-30 NOTE — TELEPHONE ENCOUNTER
Called patient to reschedule her No-Show for today with Dr Faby Whyte. Couldn't leave a message "mailbox full".

## 2023-10-31 ENCOUNTER — DOCUMENTATION (OUTPATIENT)
Dept: PSYCHIATRY | Facility: CLINIC | Age: 23
End: 2023-10-31

## 2023-10-31 NOTE — PSYCH
Psychiatric Discharge Summary   Discharge Summary: Non Compliance with Treatment 4 appointments missed with out a call  Discharge Diagnosis:SERENITY, anxiety  Treating Physician: , Treatment Complications: none, Admit with discharge: none  Prognosis at time of discharge: Fair  Presenting Problems/Pertinent Findings: SERENITY    Therapist: selvin D/C by Carmen Phillips  Course of Treatment: Medication Management  Summary of Treatment Progress: Anxiety continued especially at college, Lexapro and Xanax increased last visit in March 2023    To what extent did the consumer achieve their goals? None  Criteria for Discharge: Have 2 or more unexcused absences for services  Aftercare Recommendations: Follow up with pcp  Discharge Medications:   Current Outpatient Medications:     ALPRAZolam (XANAX) 0.5 mg tablet, TAKE 1 TABLET (0.5 MG TOTAL) BY MOUTH 2 (TWO) TIMES A DAY AS NEEDED FOR ANXIETY., Disp: 60 tablet, Rfl: 0    baclofen 20 mg tablet, Take 1 tablet (20 mg total) by mouth 3 (three) times a day, Disp: 20 tablet, Rfl: 0    escitalopram (LEXAPRO) 10 mg tablet, Take 1.5 tablets (15 mg total) by mouth daily, Disp: 30 tablet, Rfl: 3    naproxen (NAPROSYN) 250 mg tablet, Take 1 tablet (250 mg total) by mouth 2 (two) times a day with meals, Disp: 20 tablet, Rfl: 0    pantoprazole (PROTONIX) 40 mg tablet, Take 1 tablet (40 mg total) by mouth 2 (two) times a day for 14 days, Disp: 28 tablet, Rfl: 0     Describe consumers ability and willingness to work and solve her mental problem.    Initially appeared to want to adhere to treatment plan; however, frequent missed appointments started to occur     Substance Abuse History:  Social History     Substance and Sexual Activity   Drug Use Yes    Types: Marijuana    Comment: seldom       Family Psychiatric History:   Family History   Problem Relation Age of Onset    Hypertension Mother     No Known Problems Father     No Known Problems Sister     No Known Problems Brother     No Known Problems Maternal Aunt     No Known Problems Maternal Uncle     No Known Problems Paternal Aunt     No Known Problems Paternal Uncle     No Known Problems Maternal Grandmother     No Known Problems Maternal Grandfather     No Known Problems Paternal Grandmother     No Known Problems Paternal Grandfather     ADD / ADHD Neg Hx     Anesthesia problems Neg Hx     Cancer Neg Hx     Clotting disorder Neg Hx     Collagen disease Neg Hx     Diabetes Neg Hx     Dislocations Neg Hx     Learning disabilities Neg Hx     Neurological problems Neg Hx     Osteoporosis Neg Hx     Rheumatologic disease Neg Hx     Scoliosis Neg Hx     Vascular Disease Neg Hx        The following portions of the patient's history were reviewed and updated as appropriate: allergies, current medications, past family history, past medical history, past social history, past surgical history, and problem list.    Social History     Socioeconomic History    Marital status: Single     Spouse name: Not on file    Number of children: Not on file    Years of education: Not on file    Highest education level: Not on file   Occupational History    Not on file   Tobacco Use    Smoking status: Never    Smokeless tobacco: Never   Vaping Use    Vaping Use: Every day    Substances: Nicotine, THC, Flavoring   Substance and Sexual Activity    Alcohol use: Yes     Comment: occassional    Drug use: Yes     Types: Marijuana     Comment: seldom    Sexual activity: Not on file   Other Topics Concern    Not on file   Social History Narrative    Not on file     Social Determinants of Health     Financial Resource Strain: Not on file   Food Insecurity: Not on file   Transportation Needs: Not on file   Physical Activity: Not on file   Stress: Not on file   Social Connections: Not on file   Intimate Partner Violence: Not on file   Housing Stability: Not on file     Social History     Social History Narrative    Not on file     Mental status:  Appearance adequate hygiene and grooming, restless and fidgety and good eye contact    Mood depressed, anxious and mood appropriate   Affect affect appropriate    Speech a normal rate   Thought Processes normal thought processes   Hallucinations no hallucinations present    Thought Content no delusions   Abnormal Thoughts no suicidal thoughts  and no homicidal thoughts    Orientation  oriented to person and place and time   Remote Memory short term memory intact and long term memory intact   Attention Span concentration intact   Intellect Appears to be of Average Intelligence   Insight Limited insight   Judgement judgment was intact   Muscle Strength Muscle strength and tone were normal   Language no difficulty naming common objects   Fund of Knowledge displays adequate knowledge of current events   Pain none   Pain Scale 0

## 2023-11-14 DIAGNOSIS — F41.9 ANXIETY: ICD-10-CM

## 2023-11-14 DIAGNOSIS — F41.1 GAD (GENERALIZED ANXIETY DISORDER): ICD-10-CM

## 2023-11-14 RX ORDER — ALPRAZOLAM 0.5 MG/1
0.5 TABLET ORAL 2 TIMES DAILY PRN
Qty: 60 TABLET | Refills: 0 | Status: SHIPPED | OUTPATIENT
Start: 2023-11-14

## 2023-11-14 NOTE — TELEPHONE ENCOUNTER
Medication Refill Request     Name of Medication ALPRAZolam (XANAX) 0.5 mg tablet   Dose/Frequency TAKE 1 TABLET (0.5 MG TOTAL) BY MOUTH 2 (TWO) TIMES A DAY AS NEEDED FOR ANXIETY. Quantity 60  Verified pharmacy   [x]  Verified ordering Provider   [x]  Does patient have enough for the next 3 days? Yes [] No [x]  Does patient have a follow-up appointment scheduled?  Yes [x] No []   If so when is appointment: 11/30/23 @ 8 am

## 2023-12-12 DIAGNOSIS — F41.1 GAD (GENERALIZED ANXIETY DISORDER): ICD-10-CM

## 2023-12-12 RX ORDER — ESCITALOPRAM OXALATE 10 MG/1
TABLET ORAL
Qty: 30 TABLET | Refills: 0 | Status: SHIPPED | OUTPATIENT
Start: 2023-12-12

## 2023-12-15 DIAGNOSIS — F41.9 ANXIETY: ICD-10-CM

## 2023-12-15 DIAGNOSIS — F41.1 GAD (GENERALIZED ANXIETY DISORDER): ICD-10-CM

## 2023-12-17 RX ORDER — ALPRAZOLAM 0.5 MG/1
0.5 TABLET ORAL 2 TIMES DAILY PRN
Qty: 60 TABLET | Refills: 0 | Status: SHIPPED | OUTPATIENT
Start: 2023-12-17

## 2024-01-17 DIAGNOSIS — F41.9 ANXIETY: ICD-10-CM

## 2024-01-17 DIAGNOSIS — F41.1 GAD (GENERALIZED ANXIETY DISORDER): ICD-10-CM

## 2024-01-17 RX ORDER — ALPRAZOLAM 0.5 MG/1
0.5 TABLET ORAL 2 TIMES DAILY PRN
Qty: 60 TABLET | Refills: 0 | Status: SHIPPED | OUTPATIENT
Start: 2024-01-17

## 2024-05-10 ENCOUNTER — TELEPHONE (OUTPATIENT)
Dept: PSYCHIATRY | Facility: CLINIC | Age: 24
End: 2024-05-10

## 2024-05-10 NOTE — TELEPHONE ENCOUNTER
Patient came in office and requested letter for accommodations for housing for college.  Patient has an AMBROCIO in front office desk bin that needs patient's signature, and school information is on the AMBROCIO.  Patient aware that there will be a form fee for letter and will make payment and sign AMBROCIO when she is able to  letter.

## 2024-05-14 NOTE — TELEPHONE ENCOUNTER
"Elizabeth Molina, PhD  Lucina Ro; Nevaeh Olson  Caller: Unspecified (4 days ago,  5:47 PM)  She was discharged for too many no shows. What housing accommodations does she want.    Tried to call patient per request of provider Dr Elizabeth Molina requesting what type of accomodation for housing did patient need a letter for (example dormitory room by self or apartment by self?).  Called several times but unsuccessful due to \"mailbox is full\".    "

## 2025-05-08 ENCOUNTER — HOSPITAL ENCOUNTER (EMERGENCY)
Facility: HOSPITAL | Age: 25
Discharge: HOME | End: 2025-05-08
Payer: COMMERCIAL

## 2025-05-08 VITALS
SYSTOLIC BLOOD PRESSURE: 122 MMHG | OXYGEN SATURATION: 97 % | TEMPERATURE: 97.5 F | HEART RATE: 77 BPM | RESPIRATION RATE: 17 BRPM | DIASTOLIC BLOOD PRESSURE: 73 MMHG

## 2025-05-08 DIAGNOSIS — R55 VASOVAGAL EPISODE: ICD-10-CM

## 2025-05-08 DIAGNOSIS — F19.929 INTOXICATION BY DRUG (HCC): ICD-10-CM

## 2025-05-08 DIAGNOSIS — R55 SYNCOPE: Primary | ICD-10-CM

## 2025-05-08 LAB
ALBUMIN SERPL BCG-MCNC: 4.5 G/DL (ref 3.5–5)
ALP SERPL-CCNC: 55 U/L (ref 34–104)
ALT SERPL W P-5'-P-CCNC: 15 U/L (ref 7–52)
ANION GAP SERPL CALCULATED.3IONS-SCNC: 12 MMOL/L (ref 4–13)
AST SERPL W P-5'-P-CCNC: 15 U/L (ref 13–39)
BASOPHILS # BLD AUTO: 0.06 THOUSANDS/ÂΜL (ref 0–0.1)
BASOPHILS NFR BLD AUTO: 1 % (ref 0–1)
BILIRUB SERPL-MCNC: 0.43 MG/DL (ref 0.2–1)
BUN SERPL-MCNC: 9 MG/DL (ref 5–25)
CALCIUM SERPL-MCNC: 9.6 MG/DL (ref 8.4–10.2)
CHLORIDE SERPL-SCNC: 104 MMOL/L (ref 96–108)
CO2 SERPL-SCNC: 21 MMOL/L (ref 21–32)
CREAT SERPL-MCNC: 0.83 MG/DL (ref 0.6–1.3)
EOSINOPHIL # BLD AUTO: 0.27 THOUSAND/ÂΜL (ref 0–0.61)
EOSINOPHIL NFR BLD AUTO: 3 % (ref 0–6)
ERYTHROCYTE [DISTWIDTH] IN BLOOD BY AUTOMATED COUNT: 13 % (ref 11.6–15.1)
ETHANOL SERPL-MCNC: <10 MG/DL
EXT PREGNANCY TEST URINE: NEGATIVE
EXT. CONTROL: NORMAL
GFR SERPL CREATININE-BSD FRML MDRD: 99 ML/MIN/1.73SQ M
GLUCOSE SERPL-MCNC: 111 MG/DL (ref 65–140)
HCT VFR BLD AUTO: 43.4 % (ref 34.8–46.1)
HGB BLD-MCNC: 14.6 G/DL (ref 11.5–15.4)
IMM GRANULOCYTES # BLD AUTO: 0.02 THOUSAND/UL (ref 0–0.2)
IMM GRANULOCYTES NFR BLD AUTO: 0 % (ref 0–2)
LYMPHOCYTES # BLD AUTO: 2.47 THOUSANDS/ÂΜL (ref 0.6–4.47)
LYMPHOCYTES NFR BLD AUTO: 27 % (ref 14–44)
MCH RBC QN AUTO: 33.1 PG (ref 26.8–34.3)
MCHC RBC AUTO-ENTMCNC: 33.6 G/DL (ref 31.4–37.4)
MCV RBC AUTO: 98 FL (ref 82–98)
MONOCYTES # BLD AUTO: 0.61 THOUSAND/ÂΜL (ref 0.17–1.22)
MONOCYTES NFR BLD AUTO: 7 % (ref 4–12)
NEUTROPHILS # BLD AUTO: 5.69 THOUSANDS/ÂΜL (ref 1.85–7.62)
NEUTS SEG NFR BLD AUTO: 62 % (ref 43–75)
NRBC BLD AUTO-RTO: 0 /100 WBCS
PLATELET # BLD AUTO: 239 THOUSANDS/UL (ref 149–390)
PMV BLD AUTO: 10.6 FL (ref 8.9–12.7)
POTASSIUM SERPL-SCNC: 3.4 MMOL/L (ref 3.5–5.3)
PROT SERPL-MCNC: 7 G/DL (ref 6.4–8.4)
RBC # BLD AUTO: 4.41 MILLION/UL (ref 3.81–5.12)
SODIUM SERPL-SCNC: 137 MMOL/L (ref 135–147)
WBC # BLD AUTO: 9.12 THOUSAND/UL (ref 4.31–10.16)

## 2025-05-08 PROCEDURE — 93005 ELECTROCARDIOGRAM TRACING: CPT

## 2025-05-08 PROCEDURE — 99284 EMERGENCY DEPT VISIT MOD MDM: CPT

## 2025-05-08 PROCEDURE — 96360 HYDRATION IV INFUSION INIT: CPT

## 2025-05-08 PROCEDURE — 81025 URINE PREGNANCY TEST: CPT

## 2025-05-08 PROCEDURE — 85025 COMPLETE CBC W/AUTO DIFF WBC: CPT

## 2025-05-08 PROCEDURE — 80053 COMPREHEN METABOLIC PANEL: CPT

## 2025-05-08 PROCEDURE — 36415 COLL VENOUS BLD VENIPUNCTURE: CPT

## 2025-05-08 PROCEDURE — 82077 ASSAY SPEC XCP UR&BREATH IA: CPT

## 2025-05-08 RX ORDER — ONDANSETRON 2 MG/ML
4 INJECTION INTRAMUSCULAR; INTRAVENOUS ONCE
Status: DISCONTINUED | OUTPATIENT
Start: 2025-05-08 | End: 2025-05-09 | Stop reason: HOSPADM

## 2025-05-08 RX ADMIN — SODIUM CHLORIDE 1000 ML: 0.9 INJECTION, SOLUTION INTRAVENOUS at 22:19

## 2025-05-09 LAB
ATRIAL RATE: 87 BPM
P AXIS: 76 DEGREES
PR INTERVAL: 144 MS
QRS AXIS: 80 DEGREES
QRSD INTERVAL: 94 MS
QT INTERVAL: 348 MS
QTC INTERVAL: 418 MS
T WAVE AXIS: 16 DEGREES
VENTRICULAR RATE: 87 BPM

## 2025-05-09 PROCEDURE — 93010 ELECTROCARDIOGRAM REPORT: CPT | Performed by: INTERNAL MEDICINE

## 2025-05-09 NOTE — ED PROVIDER NOTES
Time reflects when diagnosis was documented in both MDM as applicable and the Disposition within this note       Time User Action Codes Description Comment    5/8/2025 10:37 PM Yokubaitis, Migel Add [R55] Syncope     5/8/2025 10:37 PM Yokubaitis, Migel Add [F19.929] Intoxication by drug (HCC)     5/8/2025 10:38 PM Yokubaitis, Migel Add [R55] Vasovagal episode           ED Disposition       ED Disposition   Discharge    Condition   Stable    Date/Time   Thu May 8, 2025 10:37 PM    Comment   Eda Alvarado discharge to home/self care.                   Assessment & Plan       Medical Decision Making  25-year-old female presenting to the emergency department due to syncopal episode.  Consistent with vasovagal episode given preceding nausea, diaphoresis, then syncope.  Especially given she is otherwise young and healthy female.  Workup showing no acute findings to suggest electrolyte disturbance, anemia, or arrhythmia predisposing patient to life-threatening episodes.  Recommend close follow-up with PCP.    Amount and/or Complexity of Data Reviewed  Labs: ordered.    Risk  Prescription drug management.             Medications   sodium chloride 0.9 % bolus 1,000 mL (0 mL Intravenous Stopped 5/8/25 2258)       ED Risk Strat Scores                    No data recorded        SBIRT 22yo+      Flowsheet Row Most Recent Value   Initial Alcohol Screen: US AUDIT-C     1. How often do you have a drink containing alcohol? 2 Filed at: 05/08/2025 2201   2. How many drinks containing alcohol do you have on a typical day you are drinking?  1 Filed at: 05/08/2025 2201   3b. FEMALE Any Age, or MALE 65+: How often do you have 4 or more drinks on one occassion? 0 Filed at: 05/08/2025 2201   Audit-C Score 3 Filed at: 05/08/2025 2201   PATRICK: How many times in the past year have you...    Used an illegal drug or used a prescription medication for non-medical reasons? Never Filed at: 05/08/2025 2201                            History of  Present Illness       Chief Complaint   Patient presents with    Syncope     Pt arrives w/ EMS for syncopal episode. Patient was in the car when she felt unwell and she got out, passed out onto grass. Reports smoking a joint and 1 shot of alcohol 3 hrs pta.        Past Medical History:   Diagnosis Date    Anxiety     Deliberate self-cutting     Diverticulitis       Past Surgical History:   Procedure Laterality Date    MOUTH SURGERY      FL OPEN TX METACARPAL FRACTURE SINGLE EA BONE Right 3/6/2019    Procedure: OPEN REDUCTION W/ INTERNAL FIXATION (ORIF) HAND;  Surgeon: Portillo Stone DO;  Location: [facility];  Service: Orthopedics      Family History   Problem Relation Age of Onset    Hypertension Mother     No Known Problems Father     No Known Problems Sister     No Known Problems Brother     No Known Problems Maternal Aunt     No Known Problems Maternal Uncle     No Known Problems Paternal Aunt     No Known Problems Paternal Uncle     No Known Problems Maternal Grandmother     No Known Problems Maternal Grandfather     No Known Problems Paternal Grandmother     No Known Problems Paternal Grandfather     ADD / ADHD Neg Hx     Anesthesia problems Neg Hx     Cancer Neg Hx     Clotting disorder Neg Hx     Collagen disease Neg Hx     Diabetes Neg Hx     Dislocations Neg Hx     Learning disabilities Neg Hx     Neurological problems Neg Hx     Osteoporosis Neg Hx     Rheumatologic disease Neg Hx     Scoliosis Neg Hx     Vascular Disease Neg Hx       Social History     Tobacco Use    Smoking status: Never    Smokeless tobacco: Never   Vaping Use    Vaping status: Every Day    Substances: Nicotine, THC, Flavoring   Substance Use Topics    Alcohol use: Yes     Comment: occassional    Drug use: Yes     Types: Marijuana     Comment: seldom      E-Cigarette/Vaping    E-Cigarette Use Current Every Day User       E-Cigarette/Vaping Substances    Nicotine Yes     THC Yes     CBD No     Flavoring Yes     Other No     Unknown No        I have reviewed and agree with the history as documented.     25-year-old female presenting to the emergency department due to syncopal episode.  Patient notes that she had drank some alcohol today, smoked marijuana, and when she was getting out of her car, she got nauseous, lightheaded, and passed out for few seconds.  Had normal return to baseline.  At this point in time, has some nausea but otherwise feels well. Has history of similar episodes.         Review of Systems   All other systems reviewed and are negative.          Objective       ED Triage Vitals [05/08/25 2155]   Temperature Pulse Blood Pressure Respirations SpO2 Patient Position - Orthostatic VS   97.5 °F (36.4 °C) 77 122/73 17 97 % --      Temp Source Heart Rate Source BP Location FiO2 (%) Pain Score    Tympanic -- -- -- --      Vitals      Date and Time Temp Pulse SpO2 Resp BP Pain Score FACES Pain Rating User   05/08/25 2155 97.5 °F (36.4 °C) 77 97 % 17 122/73 -- -- AM            Physical Exam  Vitals and nursing note reviewed.   Constitutional:       General: She is not in acute distress.     Appearance: Normal appearance. She is well-developed. She is not ill-appearing or toxic-appearing.   HENT:      Head: Normocephalic and atraumatic.      Mouth/Throat:      Mouth: Mucous membranes are moist.   Eyes:      Extraocular Movements: Extraocular movements intact.      Conjunctiva/sclera: Conjunctivae normal.   Cardiovascular:      Rate and Rhythm: Normal rate and regular rhythm.      Heart sounds: No murmur heard.  Pulmonary:      Effort: Pulmonary effort is normal. No respiratory distress.      Breath sounds: Normal breath sounds.   Abdominal:      Palpations: Abdomen is soft.      Tenderness: There is no abdominal tenderness.   Musculoskeletal:         General: No swelling.      Cervical back: Neck supple.   Skin:     General: Skin is warm and dry.      Capillary Refill: Capillary refill takes less than 2 seconds.   Neurological:      Mental  Status: She is alert.   Psychiatric:         Mood and Affect: Mood normal.         Results Reviewed       Procedure Component Value Units Date/Time    Comprehensive metabolic panel [[id number]]  (Abnormal) Collected: 05/08/25 2206    Lab Status: Final result Specimen: Blood from Arm, Left Updated: 05/08/25 2236     Sodium 137 mmol/L      Potassium 3.4 mmol/L      Chloride 104 mmol/L      CO2 21 mmol/L      ANION GAP 12 mmol/L      BUN 9 mg/dL      Creatinine 0.83 mg/dL      Glucose 111 mg/dL      Calcium 9.6 mg/dL      AST 15 U/L      ALT 15 U/L      Alkaline Phosphatase 55 U/L      Total Protein 7.0 g/dL      Albumin 4.5 g/dL      Total Bilirubin 0.43 mg/dL      eGFR 99 ml/min/1.73sq m     Narrative:      National Kid[employer] Disease Foundation guidelines for Chronic Kidney Disease (CKD):     Stage 1 with normal or high GFR (GFR > 90 mL/min/1.73 square meters)    Stage 2 Mild CKD (GFR = 60-89 mL/min/1.73 square meters)    Stage 3A Moderate CKD (GFR = 45-59 mL/min/1.73 square meters)    Stage 3B Moderate CKD (GFR = 30-44 mL/min/1.73 square meters)    Stage 4 Severe CKD (GFR = 15-29 mL/min/1.73 square meters)    Stage 5 End Stage CKD (GFR <15 mL/min/1.73 square meters)  Note: GFR calculation is accurate only with a steady state creatinine    Ethanol [[id number]]  (Normal) Collected: 05/08/25 2206    Lab Status: Final result Specimen: Blood from Arm, Left Updated: 05/08/25 2236     Ethanol Lvl <10 mg/dL     CBC and differential [[id number]] Collected: 05/08/25 2206    Lab Status: Final result Specimen: Blood from Arm, Left Updated: 05/08/25 2213     WBC 9.12 Thousand/uL      RBC 4.41 Million/uL      Hemoglobin 14.6 g/dL      Hematocrit 43.4 %      MCV 98 fL      MCH 33.1 pg      MCHC 33.6 g/dL      RDW 13.0 %      MPV 10.6 fL      Platelets 239 Thousands/uL      nRBC 0 /100 WBCs      Segmented % 62 %      Immature Grans % 0 %      Lymphocytes % 27 %      Monocytes % 7 %      Eosinophils Relative 3 %      Basophils Relative 1  %      Absolute Neutrophils 5.69 Thousands/µL      Absolute Immature Grans 0.02 Thousand/uL      Absolute Lymphocytes 2.47 Thousands/µL      Absolute Monocytes 0.61 Thousand/µL      Eosinophils Absolute 0.27 Thousand/µL      Basophils Absolute 0.06 Thousands/µL     POCT pregnancy, urine [[id number]]  (Normal) Collected: 05/08/25 2204    Lab Status: Final result Updated: 05/08/25 2205     EXT Preg Test, Ur Negative     Control Valid            No orders to display       ECG 12 Lead Documentation Only    Date/Time: 5/8/2025 10:33 PM    Performed by: Migel Oropeza MD  Authorized by: Migel Oropeza MD    ECG reviewed by me, the ED Provider: yes    Patient location:  ED  Previous ECG:     Previous ECG:  Unavailable  Interpretation:     Interpretation: abnormal    Rate:     ECG rate assessment: normal    Rhythm:     Rhythm: sinus rhythm    Ectopy:     Ectopy: none    QRS:     QRS axis:  Normal    QRS intervals:  Normal  Conduction:     Conduction: normal    ST segments:     ST segments:  Normal  T waves:     T waves: non-specific        ED Medication and Procedure Management   Prior to Admission Medications   Prescriptions Last Dose Informant Patient Reported? Taking?   ALPRAZolam (XANAX) 0.5 mg tablet   No No   Sig: TAKE 1 TABLET (0.5 MG TOTAL) BY MOUTH 2 (TWO) TIMES A DAY AS NEEDED FOR ANXIETY.   baclofen 20 mg tablet   No No   Sig: Take 1 tablet (20 mg total) by mouth 3 (three) times a day   escitalopram (LEXAPRO) 10 mg tablet   No No   Sig: TAKE 1 AND 1/2 TABLETS DAILY BY MOUTH   naproxen (NAPROSYN) 250 mg tablet   No No   Sig: Take 1 tablet (250 mg total) by mouth 2 (two) times a day with meals   pantoprazole (PROTONIX) 40 mg tablet   No No   Sig: Take 1 tablet (40 mg total) by mouth 2 (two) times a day for 14 days      Facility-Administered Medications: None     Discharge Medication List as of 5/8/2025 10:54 PM        CONTINUE these medications which have NOT CHANGED    Details   ALPRAZolam (XANAX) 0.5 mg tablet  TAKE 1 TABLET (0.5 MG TOTAL) BY MOUTH 2 (TWO) TIMES A DAY AS NEEDED FOR ANXIETY., Starting Wed 1/17/2024, Normal      baclofen 20 mg tablet Take 1 tablet (20 mg total) by mouth 3 (three) times a day, Starting Fri 12/10/2021, Normal      escitalopram (LEXAPRO) 10 mg tablet TAKE 1 AND 1/2 TABLETS DAILY BY MOUTH, Normal      naproxen (NAPROSYN) 250 mg tablet Take 1 tablet (250 mg total) by mouth 2 (two) times a day with meals, Starting Fri 12/10/2021, Normal      pantoprazole (PROTONIX) 40 mg tablet Take 1 tablet (40 mg total) by mouth 2 (two) times a day for 14 days, Starting Tue 3/29/2022, Until Tue 4/12/2022, Normal           No discharge procedures on file.  ED SEPSIS DOCUMENTATION   Time reflects when diagnosis was documented in both MDM as applicable and the Disposition within this note       Time User Action Codes Description Comment    5/8/2025 10:37 PM Migel Oropeza [R55] Syncope     5/8/2025 10:37 PM Migel Oropeza [F19.929] Intoxication by drug (HCC)     5/8/2025 10:38 PM Migel Oropeza [R55] Vasovagal episode                  Migel Oropeza MD  05/10/25 0123

## 2025-05-30 ENCOUNTER — TELEPHONE (OUTPATIENT)
Age: 25
End: 2025-05-30

## 2025-05-30 NOTE — TELEPHONE ENCOUNTER
Patient Mom called in to schedule an appt for pt.   No referral in system. Patient had 2 seizure not far apart from other another.  Pt is now scheduled 6/17/25 at 1:30 pm with Dr. Peggy dan office.   Added to wait list.   Provided our call back phone # incase they need to r/s appt.

## 2025-06-13 ENCOUNTER — TELEPHONE (OUTPATIENT)
Age: 25
End: 2025-06-13

## 2025-06-17 ENCOUNTER — OFFICE VISIT (OUTPATIENT)
Age: 25
End: 2025-06-17
Payer: COMMERCIAL

## 2025-06-17 VITALS
HEIGHT: 66 IN | SYSTOLIC BLOOD PRESSURE: 120 MMHG | BODY MASS INDEX: 25.55 KG/M2 | DIASTOLIC BLOOD PRESSURE: 82 MMHG | WEIGHT: 159 LBS | HEART RATE: 92 BPM

## 2025-06-17 DIAGNOSIS — R56.9 SEIZURE (HCC): Primary | ICD-10-CM

## 2025-06-17 DIAGNOSIS — R55 SYNCOPE: ICD-10-CM

## 2025-06-17 PROCEDURE — 99205 OFFICE O/P NEW HI 60 MIN: CPT

## 2025-06-17 RX ORDER — LACOSAMIDE 100 MG/1
100 TABLET ORAL EVERY 12 HOURS SCHEDULED
Qty: 60 TABLET | Refills: 5 | Status: SHIPPED | OUTPATIENT
Start: 2025-06-17

## 2025-06-17 NOTE — PROGRESS NOTES
Name: Eda Alvarado      : 2000      MRN: [id number]  Encounter Provider: Dyllan Palmer MD  Encounter Date: 2025   Enc[facility]  :  Assessment & Plan  Seizure (HCC)    Orders:    MRI brain with and without contrast; Future    lacosamide (VIMPAT) 100 mg tablet; Take 1 tablet (100 mg total) by mouth every 12 (twelve) hours    EEG Prolonged > 1 hour; Future  - 3 months  - caridology  - lacosmide   - eeg, MRI  Syncope    Orders:    Ambulatory Referral to Cardiology; Future    Patient is a 25 year old right handed female with PMH of anxiety and drug abuse who presents for evaluation of syncope vs seizures.    She had an event on 2025, when she was at quick check standing. She suddenly lost her sense of hearing, vision went dark, then collapsed and had generalized shaking. She had possible fecal incontinenence. Denies tongue biting. She had post ictal confusion. She did not go to ED after this event.    On 2025, she was hanging out with her friends in a parking lot. She smoked marijuana in the car. When she got up to walk, she collapsed and was witnessed to have GTC activity. EMS witnessed seizure-like activity. She was taken to ED, and she was thought to have an syncopal episode that day.     In 10/2024, she was walking on campus, then suddenly collapsed and lost consciousness. She had generalized shaking. Denies incontinence or tongue biting.     In , she used exstasy, cocaine, methamphetamine, mushrooms. During that time, she had multiple episodes of loss of consciousness with generalized shaking and urinary incontinence.     Based on multiple witnessed events of loss of consciousness with bilateral shaking and occasional lip twitching, patient likely has an underlying seizure disorder.     Plan:  - Start lacosamide 100mg BID  - Order MRI Brain with and without contrast  - Ordered routine EEG  - Patient is aware that she is  restricted from driving for 6 months since her last seizure  - Ordered follow up with cardiology given history of possible syncope and irregular heart beats  - Please follow up with PCP if you continue to have abdominal pain  - Follow up in 3 months        History of Present Illness   HPI     Eda Alvarado is a 25 year old right handed female with PMH of anxiety and drug abuse who presents for evaluation of syncope vs seizures.    She had an event on 5/28/2025, when she was at quick check standing. She suddenly lost her sense of hearing, vision went dark, then collapsed and had generalized shaking. She had possible fecal incontinenence. Denies tongue biting. She had post ictal confusion. She did not go to ED after this event.    On 5/8/2025, she was hanging out with her friends in a parking lot. She smoked marijuana in the car. When she got up to walk, she collapsed and was witnessed to have GTC activity. EMS witnessed seizure-like activity. She was taken to ED, and she was thought to have an syncopal episode that day.     In 10/2024, she was walking on campus, then suddenly collapsed and lost consciousness. She had generalized shaking. Denies incontinence or tongue biting.     After her seizure-like episodes, she often has lip twitching.     In 2017, she used exstasy, cocaine, methamphetamine, mushrooms. During that time, she had multiple episodes of loss of consciousness with generalized shaking and urinary incontinence.     She was never worked up for seizures. She thinks her seizure-like activity may be related to drug use when she was younger.     MRI Brain 1/2022 is mostly normal.     In the past, she was told she had irregular heart beats, however, has never followed up with cardiology. She has occasional stomach aches and was apparently diagnosed with diverticulitis when she was younger. Pain is in the left lower abdomen. She has regular menstrual cycles, last period was one week ago.    Social Hx:  -  She works as a camp counselor  - She smokes marijuana and cigarettes    Event/Seizure semiology:  Event type 1: Generalzied tonic clonic activity   - Frequency/Date of last event: 5/28/2025  - Warning/Aura: yes - losses sense of hearing  - Loss of awareness: yes   - Amnestic to the event: yes   - Semiology: generalized shaking  - Presence of post-ictal period: yes -    - Sonorus breathing: no  - Incontinence: yes   - Tongue biting: no    Special Features  Age/Date of seizure onset: 17  Status epilepticus: no  Self Injury Seizures: no  Precipitating Factors:    Longest seizure free interval: 2 years    Epilepsy Risk Factors:  Drug abuse    Current AEDs:  None  Medication side effects: None  Medication adherence: No    Prior AEDs:  None    Prior Evaluation:  - routine EEG: no  - cEEG/EMU: no  - MRI brain: yes - 2022 - normal  - PET: no  - fMRI: no  - Ictal SPECT: no  - Neuropsych evaluation: no  - Other:     History Reviewed:   The following were reviewed and updated as appropriate: allergies, current medications, past family history, past medical history, past social history, past surgical history, and problem list     Psychiatric History:  Anxiety  Depression    Social History:   Driving: No  Lives Alone: No  Occupation: camp counselor       Review of Systems   Constitutional:  Negative for appetite change, fatigue and fever.   HENT: Negative.  Negative for hearing loss, tinnitus, trouble swallowing and voice change.    Eyes:  Positive for visual disturbance. Negative for photophobia and pain.   Respiratory: Negative.  Negative for shortness of breath.    Cardiovascular: Negative.  Negative for palpitations.   Gastrointestinal: Negative.  Negative for nausea and vomiting.   Endocrine: Negative.  Negative for cold intolerance.   Genitourinary: Negative.  Negative for dysuria, frequency and urgency.   Musculoskeletal:  Negative for back pain, gait problem, myalgias, neck pain and neck stiffness.   Skin: Negative.   "Negative for rash.   Allergic/Immunologic: Negative.    Neurological:  Positive for dizziness, seizures, syncope and numbness. Negative for tremors, facial asymmetry, speech difficulty, weakness, light-headedness and headaches.   Hematological: Negative.  Does not bruise/bleed easily.   Psychiatric/Behavioral:  Positive for confusion. Negative for hallucinations and sleep disturbance.     I have personally reviewed the MA's review of systems and made changes as necessary.       Objective   /82 (BP Location: Left arm, Patient Position: Sitting, Cuff Size: Large)   Pulse 92   Ht 5' 6\" (1.676 m)   Wt 72.1 kg (159 lb)   BMI 25.66 kg/m²     Physical Exam  Neurological Exam  /82 (BP Location: Left arm, Patient Position: Sitting, Cuff Size: Large)   Pulse 92   Ht 5' 6\" (1.676 m)   Wt 72.1 kg (159 lb)   BMI 25.66 kg/m²      General Exam  General: well developed, no acute distress.  HEENT: mucous membranes moist, anicteric sclera.   Neck: supple, good ROM.      Neurological Exam  Mental Status: awake, alert, and fully oriented to person, place, time, and situation. Attention and memory intact. Fund of knowledge is appropriate for age and education.  There is no neglect.    Language: fluency, and comprehension normal.       Cranial Nerves: Pupils equal and reactive to light.  Visual fields full to confrontation. Extraocular motions intact with full versions, normal pursuits and saccades. Facial strength full and symmetric. Facial sensation intact in V1-V3. Hearing intact to voice. Tongue protrudes to midline. Palate elevates symmetrically. Speech clear without notable dysarthria. Shoulder shrug activation full and symmetric.    Motor: Normal bulk and tone. No pronator drift. Strength is 5/5 proximally and distally in all 4 extremities. No involuntary movements.    Sensory: Sensation intact to light touch distally in all extremities.    Coordination: Normal finger-to-nose. Normal rapid alternating " movements.     Station and gait: Casual and tandem gait normal. Normal Romberg.    Reflexes: Reflexes 2+ throughout and symmetric. Both toes down going.    Administrative Statements   I have spent a total time of 60 minutes in caring for this patient on the day of the visit/encounter including Diagnostic results, Risks and benefits of tx options, Instructions for management, Importance of tx compliance, Risk factor reductions, Counseling / Coordination of care, Obtaining or reviewing history  , and Communicating with other healthcare professionals .

## 2025-07-28 ENCOUNTER — HOSPITAL ENCOUNTER (OUTPATIENT)
Dept: NEUROLOGY | Facility: HOSPITAL | Age: 25
Discharge: HOME | End: 2025-07-28
Payer: COMMERCIAL

## 2025-07-28 ENCOUNTER — RESULTS FOLLOW-UP (OUTPATIENT)
Age: 25
End: 2025-07-28

## 2025-07-28 DIAGNOSIS — R56.9 SEIZURE (HCC): ICD-10-CM

## 2025-07-28 PROCEDURE — 95813 EEG EXTND MNTR 61-119 MIN: CPT

## 2025-07-28 PROCEDURE — 95813 EEG EXTND MNTR 61-119 MIN: CPT | Performed by: PSYCHIATRY & NEUROLOGY

## 2025-07-29 ENCOUNTER — HOSPITAL ENCOUNTER (OUTPATIENT)
Dept: RADIOLOGY | Facility: HOSPITAL | Age: 25
Discharge: HOME | End: 2025-07-29
Payer: COMMERCIAL

## 2025-07-29 DIAGNOSIS — R56.9 SEIZURE (HCC): ICD-10-CM

## 2025-07-29 PROCEDURE — A9585 GADOBUTROL INJECTION: HCPCS

## 2025-07-29 PROCEDURE — 70553 MRI BRAIN STEM W/O & W/DYE: CPT

## 2025-07-29 RX ORDER — GADOBUTROL 604.72 MG/ML
7.5 INJECTION INTRAVENOUS
Status: COMPLETED | OUTPATIENT
Start: 2025-07-29 | End: 2025-07-29

## 2025-07-29 RX ADMIN — GADOBUTROL 7.5 ML: 604.72 INJECTION INTRAVENOUS at 19:10

## 2025-08-15 ENCOUNTER — HOSPITAL ENCOUNTER (EMERGENCY)
Facility: HOSPITAL | Age: 25
Discharge: HOME | End: 2025-08-15
Attending: EMERGENCY MEDICINE
Payer: COMMERCIAL

## 2025-08-15 ENCOUNTER — APPOINTMENT (EMERGENCY)
Dept: RADIOLOGY | Facility: HOSPITAL | Age: 25
End: 2025-08-15
Payer: COMMERCIAL

## 2025-08-15 VITALS
RESPIRATION RATE: 18 BRPM | WEIGHT: 150 LBS | BODY MASS INDEX: 24.11 KG/M2 | TEMPERATURE: 97.9 F | SYSTOLIC BLOOD PRESSURE: 142 MMHG | DIASTOLIC BLOOD PRESSURE: 90 MMHG | HEIGHT: 66 IN | HEART RATE: 90 BPM | OXYGEN SATURATION: 96 %

## 2025-08-15 DIAGNOSIS — S40.012A TRAUMATIC ECCHYMOSIS OF LEFT SHOULDER, INITIAL ENCOUNTER: ICD-10-CM

## 2025-08-15 DIAGNOSIS — S46.912A STRAIN OF LEFT SHOULDER, INITIAL ENCOUNTER: Primary | ICD-10-CM

## 2025-08-15 LAB
EXT PREGNANCY TEST URINE: NEGATIVE
EXT. CONTROL: NORMAL

## 2025-08-15 PROCEDURE — 71101 X-RAY EXAM UNILAT RIBS/CHEST: CPT

## 2025-08-15 PROCEDURE — 81025 URINE PREGNANCY TEST: CPT | Performed by: PHYSICIAN ASSISTANT

## 2025-08-15 PROCEDURE — 73030 X-RAY EXAM OF SHOULDER: CPT

## 2025-08-15 RX ORDER — KETOROLAC TROMETHAMINE 30 MG/ML
15 INJECTION, SOLUTION INTRAMUSCULAR; INTRAVENOUS ONCE
Status: COMPLETED | OUTPATIENT
Start: 2025-08-15 | End: 2025-08-15

## 2025-08-15 RX ORDER — ACETAMINOPHEN 325 MG/1
975 TABLET ORAL ONCE
Status: COMPLETED | OUTPATIENT
Start: 2025-08-15 | End: 2025-08-15

## 2025-08-15 RX ADMIN — KETOROLAC TROMETHAMINE 15 MG: 30 INJECTION, SOLUTION INTRAMUSCULAR; INTRAVENOUS at 17:22

## 2025-08-15 RX ADMIN — ACETAMINOPHEN 975 MG: 325 TABLET ORAL at 17:22

## 2025-08-18 ENCOUNTER — TELEPHONE (OUTPATIENT)
Age: 25
End: 2025-08-18

## (undated) DEVICE — ACE WRAP 3 IN UNSTERILE

## (undated) DEVICE — BASIC DOUBLE BASIN 2-LF: Brand: MEDLINE INDUSTRIES, INC.

## (undated) DEVICE — GLOVE INDICATOR PI UNDERGLOVE SZ 7.5 BLUE

## (undated) DEVICE — COBAN 4 IN STERILE

## (undated) DEVICE — PACK GENERAL LF

## (undated) DEVICE — DRAPE SHEET THREE QUARTER

## (undated) DEVICE — 1.5MM TI CORTEX SCR SLF-TPNG WITH T4 STARDRIVE RECESS 10MM
Type: IMPLANTABLE DEVICE | Site: HAND | Status: NON-FUNCTIONAL
Removed: 2019-03-06

## (undated) DEVICE — 3M™ STERI-STRIP™ REINFORCED ADHESIVE SKIN CLOSURES, R1547, 1/2 IN X 4 IN (12 MM X 100 MM), 6 STRIPS/ENVELOPE: Brand: 3M™ STERI-STRIP™

## (undated) DEVICE — SCD SEQUENTIAL COMPRESSION COMFORT SLEEVE MEDIUM KNEE LENGTH: Brand: KENDALL SCD

## (undated) DEVICE — STERI STRIP 1/2 INCH

## (undated) DEVICE — BANDAGE, ESMARK LF STR 4"X9'(20/CS): Brand: CYPRESS

## (undated) DEVICE — PADDING,UNDERCAST,COTTON, 4"X4YD STERILE: Brand: MEDLINE

## (undated) DEVICE — INTENDED FOR TISSUE SEPARATION, AND OTHER PROCEDURES THAT REQUIRE A SHARP SURGICAL BLADE TO PUNCTURE OR CUT.: Brand: BARD-PARKER SAFETY BLADES SIZE 15, STERILE

## (undated) DEVICE — GLOVE SRG BIOGEL 7.5

## (undated) DEVICE — 1.5MM DRILL BIT/MQC FOR GLIDING HOLE/48MM

## (undated) DEVICE — STOCKINETTE REGULAR

## (undated) DEVICE — SUT VICRYL 3-0 SH 27 IN J416H

## (undated) DEVICE — BRUSH EZ SCRUB PCMX W/NAIL CLEANER

## (undated) DEVICE — VIAL DECANTER

## (undated) DEVICE — CUFF TOURNIQUET 18 X 4 IN QUICK CONNECT DISP 1 BLADDER

## (undated) DEVICE — GAUZE SPONGES,16 PLY: Brand: CURITY

## (undated) DEVICE — FABRIC REINFORCED, SURGICAL GOWN, XL: Brand: CONVERTORS

## (undated) DEVICE — TIBURON EXTREMITY SHEET: Brand: CONVERTORS

## (undated) DEVICE — DRAPE C-ARM X-RAY

## (undated) DEVICE — 1.1MM DRILL BIT/MQC FOR THREADED HOLE/56MM